# Patient Record
Sex: MALE | Race: WHITE | NOT HISPANIC OR LATINO | URBAN - METROPOLITAN AREA
[De-identification: names, ages, dates, MRNs, and addresses within clinical notes are randomized per-mention and may not be internally consistent; named-entity substitution may affect disease eponyms.]

---

## 2018-10-09 ENCOUNTER — IMPORTED ENCOUNTER (OUTPATIENT)
Dept: URBAN - METROPOLITAN AREA CLINIC 50 | Facility: CLINIC | Age: 68
End: 2018-10-09

## 2018-11-05 ENCOUNTER — IMPORTED ENCOUNTER (OUTPATIENT)
Dept: URBAN - METROPOLITAN AREA CLINIC 50 | Facility: CLINIC | Age: 68
End: 2018-11-05

## 2019-11-06 ENCOUNTER — IMPORTED ENCOUNTER (OUTPATIENT)
Dept: URBAN - METROPOLITAN AREA CLINIC 50 | Facility: CLINIC | Age: 69
End: 2019-11-06

## 2019-11-06 NOTE — PATIENT DISCUSSION
"""Follow drusen without treatment. Call if vision or distortion increases.  Recommend sunglasses Addended by: Yunier Brian on: 11/16/2018 01:19 PM     Modules accepted: Orders

## 2020-11-09 ENCOUNTER — IMPORTED ENCOUNTER (OUTPATIENT)
Dept: URBAN - METROPOLITAN AREA CLINIC 50 | Facility: CLINIC | Age: 70
End: 2020-11-09

## 2021-04-18 ASSESSMENT — VISUAL ACUITY
OD_BAT: 20/50
OD_BAT: 20/40
OS_PH: 20/25
OS_OTHER: 20/40. 20/100.
OS_CC: 20/30
OS_OTHER: 20/30. 20/80-.
OS_CC: J1+
OD_CC: J1+
OS_CC: J1+
OD_CC: 20/30-2
OS_BAT: 20/40
OD_PH: 20/25
OD_OTHER: 20/40. 20/80.
OS_CC: 20/20-
OD_OTHER: 20/25. 20/30.
OS_CC: 20/30-1
OS_CC: J1+
OD_OTHER: 20/50. 20/100.
OD_CC: J1+
OS_BAT: 20/30
OD_CC: 20/25
OD_CC: 20/20
OS_BAT: 20/20
OD_BAT: 20/25
OD_CC: J1+
OS_OTHER: 20/20. 20/25.

## 2021-04-18 ASSESSMENT — TONOMETRY
OD_IOP_MMHG: 19
OS_IOP_MMHG: 16
OD_IOP_MMHG: 15
OD_IOP_MMHG: 19
OS_IOP_MMHG: 20
OS_IOP_MMHG: 20

## 2021-11-08 ENCOUNTER — PREPPED CHART (OUTPATIENT)
Dept: URBAN - METROPOLITAN AREA CLINIC 49 | Facility: CLINIC | Age: 71
End: 2021-11-08

## 2021-11-10 ENCOUNTER — ROUTINE EXAM (OUTPATIENT)
Dept: URBAN - METROPOLITAN AREA CLINIC 49 | Facility: CLINIC | Age: 71
End: 2021-11-10

## 2021-11-10 DIAGNOSIS — H40.053: ICD-10-CM

## 2021-11-10 DIAGNOSIS — H25.13: ICD-10-CM

## 2021-11-10 DIAGNOSIS — H35.372: ICD-10-CM

## 2021-11-10 DIAGNOSIS — E11.9: ICD-10-CM

## 2021-11-10 DIAGNOSIS — H35.361: ICD-10-CM

## 2021-11-10 DIAGNOSIS — Z01.01: ICD-10-CM

## 2021-11-10 PROCEDURE — 92134 CPTRZ OPH DX IMG PST SGM RTA: CPT

## 2021-11-10 PROCEDURE — 92014 COMPRE OPH EXAM EST PT 1/>: CPT

## 2021-11-10 ASSESSMENT — VISUAL ACUITY
OS_GLARE: 20/20
OD_GLARE: 20/20
OD_CC: 20/25
OS_PH: 20/25
OS_CC: 20/30-1
OU_CC: J1+
OS_GLARE: 20/20
OD_GLARE: 20/20

## 2021-11-10 ASSESSMENT — TONOMETRY
OD_IOP_MMHG: 21
OS_IOP_MMHG: 23

## 2022-03-09 ENCOUNTER — FOLLOW UP (OUTPATIENT)
Dept: URBAN - METROPOLITAN AREA CLINIC 49 | Facility: CLINIC | Age: 72
End: 2022-03-09

## 2022-03-09 DIAGNOSIS — H40.053: ICD-10-CM

## 2022-03-09 PROCEDURE — 92012 INTRM OPH EXAM EST PATIENT: CPT

## 2022-03-09 ASSESSMENT — VISUAL ACUITY
OS_CC: 20/20-1
OD_CC: 20/20-1

## 2022-03-09 ASSESSMENT — TONOMETRY
OS_IOP_MMHG: 18
OD_IOP_MMHG: 18

## 2024-04-08 ENCOUNTER — COMPREHENSIVE EXAM (OUTPATIENT)
Dept: URBAN - METROPOLITAN AREA CLINIC 49 | Facility: LOCATION | Age: 74
End: 2024-04-08

## 2024-04-08 DIAGNOSIS — H35.371: ICD-10-CM

## 2024-04-08 DIAGNOSIS — E11.9: ICD-10-CM

## 2024-04-08 DIAGNOSIS — H52.4: ICD-10-CM

## 2024-04-08 DIAGNOSIS — Z01.01: ICD-10-CM

## 2024-04-08 DIAGNOSIS — H25.13: ICD-10-CM

## 2024-04-08 PROCEDURE — 92014 COMPRE OPH EXAM EST PT 1/>: CPT

## 2024-04-08 PROCEDURE — 92134 CPTRZ OPH DX IMG PST SGM RTA: CPT

## 2024-04-08 PROCEDURE — 92015 DETERMINE REFRACTIVE STATE: CPT

## 2024-04-08 ASSESSMENT — VISUAL ACUITY
OD_GLARE: 20/20
OD_GLARE: 20/30
OS_CC: 20/40+3
OU_CC: 20/20
OS_GLARE: 20/40
OD_CC: 20/20
OU_CC: J1+@16"
OS_PH: 20/25
OS_GLARE: 20/25

## 2024-04-08 ASSESSMENT — TONOMETRY
OS_IOP_MMHG: 15
OD_IOP_MMHG: 14